# Patient Record
Sex: MALE | Race: WHITE | Employment: UNEMPLOYED | URBAN - METROPOLITAN AREA
[De-identification: names, ages, dates, MRNs, and addresses within clinical notes are randomized per-mention and may not be internally consistent; named-entity substitution may affect disease eponyms.]

---

## 2022-03-07 ENCOUNTER — HOSPITAL ENCOUNTER (INPATIENT)
Age: 67
LOS: 2 days | Discharge: LEFT BEFORE DISCHARGE | DRG: 872 | End: 2022-03-10
Attending: EMERGENCY MEDICINE | Admitting: STUDENT IN AN ORGANIZED HEALTH CARE EDUCATION/TRAINING PROGRAM
Payer: MEDICARE

## 2022-03-07 ENCOUNTER — APPOINTMENT (OUTPATIENT)
Dept: GENERAL RADIOLOGY | Age: 67
DRG: 872 | End: 2022-03-07
Payer: MEDICARE

## 2022-03-07 DIAGNOSIS — Z51.81 THERAPEUTIC DRUG MONITORING: ICD-10-CM

## 2022-03-07 DIAGNOSIS — L03.114 CELLULITIS OF LEFT UPPER EXTREMITY: Primary | ICD-10-CM

## 2022-03-07 LAB
ALBUMIN SERPL-MCNC: 3.4 GM/DL (ref 3.4–5)
ALP BLD-CCNC: 114 IU/L (ref 40–129)
ALT SERPL-CCNC: 8 U/L (ref 10–40)
ANION GAP SERPL CALCULATED.3IONS-SCNC: 12 MMOL/L (ref 4–16)
AST SERPL-CCNC: 17 IU/L (ref 15–37)
BASOPHILS ABSOLUTE: 0.1 K/CU MM
BASOPHILS RELATIVE PERCENT: 0.4 % (ref 0–1)
BILIRUB SERPL-MCNC: 0.6 MG/DL (ref 0–1)
BUN BLDV-MCNC: 6 MG/DL (ref 6–23)
CALCIUM SERPL-MCNC: 8.6 MG/DL (ref 8.3–10.6)
CHLORIDE BLD-SCNC: 93 MMOL/L (ref 99–110)
CO2: 24 MMOL/L (ref 21–32)
CREAT SERPL-MCNC: 0.9 MG/DL (ref 0.9–1.3)
DIFFERENTIAL TYPE: ABNORMAL
EOSINOPHILS ABSOLUTE: 0.1 K/CU MM
EOSINOPHILS RELATIVE PERCENT: 0.7 % (ref 0–3)
GFR AFRICAN AMERICAN: >60 ML/MIN/1.73M2
GFR NON-AFRICAN AMERICAN: >60 ML/MIN/1.73M2
GLUCOSE BLD-MCNC: 103 MG/DL (ref 70–99)
HCT VFR BLD CALC: 48.4 % (ref 42–52)
HEMOGLOBIN: 16.4 GM/DL (ref 13.5–18)
IMMATURE NEUTROPHIL %: 0.6 % (ref 0–0.43)
LACTATE: 1.6 MMOL/L (ref 0.4–2)
LYMPHOCYTES ABSOLUTE: 2.4 K/CU MM
LYMPHOCYTES RELATIVE PERCENT: 18.1 % (ref 24–44)
MCH RBC QN AUTO: 32.9 PG (ref 27–31)
MCHC RBC AUTO-ENTMCNC: 33.9 % (ref 32–36)
MCV RBC AUTO: 97 FL (ref 78–100)
MONOCYTES ABSOLUTE: 1.2 K/CU MM
MONOCYTES RELATIVE PERCENT: 8.8 % (ref 0–4)
NUCLEATED RBC %: 0 %
PDW BLD-RTO: 13.3 % (ref 11.7–14.9)
PLATELET # BLD: 213 K/CU MM (ref 140–440)
PMV BLD AUTO: 9 FL (ref 7.5–11.1)
POTASSIUM SERPL-SCNC: 3.4 MMOL/L (ref 3.5–5.1)
RBC # BLD: 4.99 M/CU MM (ref 4.6–6.2)
SEGMENTED NEUTROPHILS ABSOLUTE COUNT: 9.5 K/CU MM
SEGMENTED NEUTROPHILS RELATIVE PERCENT: 71.4 % (ref 36–66)
SODIUM BLD-SCNC: 129 MMOL/L (ref 135–145)
TOTAL IMMATURE NEUTOROPHIL: 0.08 K/CU MM
TOTAL NUCLEATED RBC: 0 K/CU MM
TOTAL PROTEIN: 6.8 GM/DL (ref 6.4–8.2)
WBC # BLD: 13.3 K/CU MM (ref 4–10.5)

## 2022-03-07 PROCEDURE — 85025 COMPLETE CBC W/AUTO DIFF WBC: CPT

## 2022-03-07 PROCEDURE — 73110 X-RAY EXAM OF WRIST: CPT

## 2022-03-07 PROCEDURE — 83605 ASSAY OF LACTIC ACID: CPT

## 2022-03-07 PROCEDURE — 87040 BLOOD CULTURE FOR BACTERIA: CPT

## 2022-03-07 PROCEDURE — 80053 COMPREHEN METABOLIC PANEL: CPT

## 2022-03-07 RX ORDER — CLINDAMYCIN PHOSPHATE 900 MG/50ML
900 INJECTION INTRAVENOUS ONCE
Status: COMPLETED | OUTPATIENT
Start: 2022-03-07 | End: 2022-03-08

## 2022-03-07 RX ORDER — 0.9 % SODIUM CHLORIDE 0.9 %
1000 INTRAVENOUS SOLUTION INTRAVENOUS ONCE
Status: COMPLETED | OUTPATIENT
Start: 2022-03-07 | End: 2022-03-08

## 2022-03-07 RX ORDER — KETOROLAC TROMETHAMINE 30 MG/ML
15 INJECTION, SOLUTION INTRAMUSCULAR; INTRAVENOUS ONCE
Status: COMPLETED | OUTPATIENT
Start: 2022-03-07 | End: 2022-03-08

## 2022-03-07 RX ORDER — SODIUM CHLORIDE 9 MG/ML
INJECTION, SOLUTION INTRAVENOUS CONTINUOUS
Status: DISCONTINUED | OUTPATIENT
Start: 2022-03-08 | End: 2022-03-08

## 2022-03-07 ASSESSMENT — PAIN DESCRIPTION - FREQUENCY: FREQUENCY: CONTINUOUS

## 2022-03-07 ASSESSMENT — PAIN DESCRIPTION - ORIENTATION: ORIENTATION: LEFT

## 2022-03-07 ASSESSMENT — PAIN DESCRIPTION - ONSET: ONSET: SUDDEN

## 2022-03-07 ASSESSMENT — PAIN DESCRIPTION - DESCRIPTORS: DESCRIPTORS: ACHING;BURNING

## 2022-03-07 ASSESSMENT — PAIN SCALES - GENERAL: PAINLEVEL_OUTOF10: 8

## 2022-03-07 ASSESSMENT — PAIN DESCRIPTION - PAIN TYPE: TYPE: ACUTE PAIN

## 2022-03-07 ASSESSMENT — PAIN DESCRIPTION - PROGRESSION: CLINICAL_PROGRESSION: GRADUALLY WORSENING

## 2022-03-07 ASSESSMENT — PAIN DESCRIPTION - LOCATION: LOCATION: ARM

## 2022-03-08 PROBLEM — A41.9 SEPSIS (HCC): Status: ACTIVE | Noted: 2022-03-08

## 2022-03-08 PROBLEM — I25.2 OLD INFEROLATERAL MYOCARDIAL INFARCTION: Status: ACTIVE | Noted: 2017-02-14

## 2022-03-08 PROBLEM — I25.5 ISCHEMIC CARDIOMYOPATHY: Status: ACTIVE | Noted: 2017-02-14

## 2022-03-08 PROBLEM — Z95.5 STENTED CORONARY ARTERY: Status: ACTIVE | Noted: 2017-02-14

## 2022-03-08 PROBLEM — I51.9 LEFT VENTRICULAR SYSTOLIC DYSFUNCTION: Status: ACTIVE | Noted: 2017-02-14

## 2022-03-08 PROBLEM — I25.10 ATHEROSCLEROSIS OF NATIVE CORONARY ARTERY OF NATIVE HEART WITHOUT ANGINA PECTORIS: Status: ACTIVE | Noted: 2017-02-14

## 2022-03-08 PROBLEM — R74.01 TRANSAMINITIS: Status: ACTIVE | Noted: 2017-01-24

## 2022-03-08 LAB
BACTERIA: ABNORMAL /HPF
BILIRUBIN URINE: NEGATIVE MG/DL
BLOOD, URINE: ABNORMAL
CLARITY: CLEAR
COLOR: YELLOW
CREATININE URINE: 83 MG/DL
GLUCOSE, URINE: NEGATIVE MG/DL
KETONES, URINE: NEGATIVE MG/DL
LEUKOCYTE ESTERASE, URINE: NEGATIVE
NITRITE URINE, QUANTITATIVE: NEGATIVE
PH, URINE: 6 (ref 5–8)
PROTEIN UA: NEGATIVE MG/DL
RBC URINE: <1 /HPF (ref 0–3)
SODIUM URINE: 70 MMOL/L (ref 35–167)
SPECIFIC GRAVITY UA: 1.01 (ref 1–1.03)
SQUAMOUS EPITHELIAL: <1 /HPF
UROBILINOGEN, URINE: 0.2 MG/DL (ref 0.2–1)
WBC UA: <1 /HPF (ref 0–2)

## 2022-03-08 PROCEDURE — 87075 CULTR BACTERIA EXCEPT BLOOD: CPT

## 2022-03-08 PROCEDURE — 81001 URINALYSIS AUTO W/SCOPE: CPT

## 2022-03-08 PROCEDURE — 87070 CULTURE OTHR SPECIMN AEROBIC: CPT

## 2022-03-08 PROCEDURE — 6360000002 HC RX W HCPCS: Performed by: PHYSICIAN ASSISTANT

## 2022-03-08 PROCEDURE — 96365 THER/PROPH/DIAG IV INF INIT: CPT

## 2022-03-08 PROCEDURE — 2500000003 HC RX 250 WO HCPCS: Performed by: STUDENT IN AN ORGANIZED HEALTH CARE EDUCATION/TRAINING PROGRAM

## 2022-03-08 PROCEDURE — 84300 ASSAY OF URINE SODIUM: CPT

## 2022-03-08 PROCEDURE — 6370000000 HC RX 637 (ALT 250 FOR IP): Performed by: STUDENT IN AN ORGANIZED HEALTH CARE EDUCATION/TRAINING PROGRAM

## 2022-03-08 PROCEDURE — 96375 TX/PRO/DX INJ NEW DRUG ADDON: CPT

## 2022-03-08 PROCEDURE — 2500000003 HC RX 250 WO HCPCS: Performed by: PHYSICIAN ASSISTANT

## 2022-03-08 PROCEDURE — 2580000003 HC RX 258: Performed by: PHYSICIAN ASSISTANT

## 2022-03-08 PROCEDURE — 87086 URINE CULTURE/COLONY COUNT: CPT

## 2022-03-08 PROCEDURE — 2580000003 HC RX 258: Performed by: STUDENT IN AN ORGANIZED HEALTH CARE EDUCATION/TRAINING PROGRAM

## 2022-03-08 PROCEDURE — 6360000002 HC RX W HCPCS: Performed by: STUDENT IN AN ORGANIZED HEALTH CARE EDUCATION/TRAINING PROGRAM

## 2022-03-08 PROCEDURE — 82570 ASSAY OF URINE CREATININE: CPT

## 2022-03-08 PROCEDURE — 80061 LIPID PANEL: CPT

## 2022-03-08 PROCEDURE — 87081 CULTURE SCREEN ONLY: CPT

## 2022-03-08 PROCEDURE — 87040 BLOOD CULTURE FOR BACTERIA: CPT

## 2022-03-08 PROCEDURE — 99285 EMERGENCY DEPT VISIT HI MDM: CPT

## 2022-03-08 PROCEDURE — 87077 CULTURE AEROBIC IDENTIFY: CPT

## 2022-03-08 PROCEDURE — 80048 BASIC METABOLIC PNL TOTAL CA: CPT

## 2022-03-08 PROCEDURE — 6370000000 HC RX 637 (ALT 250 FOR IP): Performed by: INTERNAL MEDICINE

## 2022-03-08 PROCEDURE — 1200000000 HC SEMI PRIVATE

## 2022-03-08 RX ORDER — NICOTINE 21 MG/24HR
1 PATCH, TRANSDERMAL 24 HOURS TRANSDERMAL DAILY
Status: DISCONTINUED | OUTPATIENT
Start: 2022-03-08 | End: 2022-03-10 | Stop reason: HOSPADM

## 2022-03-08 RX ORDER — 0.9 % SODIUM CHLORIDE 0.9 %
1000 INTRAVENOUS SOLUTION INTRAVENOUS ONCE
Status: COMPLETED | OUTPATIENT
Start: 2022-03-08 | End: 2022-03-08

## 2022-03-08 RX ORDER — ONDANSETRON 4 MG/1
4 TABLET, ORALLY DISINTEGRATING ORAL EVERY 8 HOURS PRN
Status: DISCONTINUED | OUTPATIENT
Start: 2022-03-08 | End: 2022-03-10 | Stop reason: HOSPADM

## 2022-03-08 RX ORDER — HYDROXYZINE PAMOATE 25 MG/1
25 CAPSULE ORAL 3 TIMES DAILY PRN
COMMUNITY
Start: 2021-09-06

## 2022-03-08 RX ORDER — POTASSIUM CHLORIDE 20 MEQ/1
10 TABLET, EXTENDED RELEASE ORAL ONCE
Status: COMPLETED | OUTPATIENT
Start: 2022-03-08 | End: 2022-03-08

## 2022-03-08 RX ORDER — CARVEDILOL 6.25 MG/1
6.25 TABLET ORAL 2 TIMES DAILY WITH MEALS
COMMUNITY

## 2022-03-08 RX ORDER — POLYETHYLENE GLYCOL 3350 17 G/17G
17 POWDER, FOR SOLUTION ORAL DAILY PRN
Status: DISCONTINUED | OUTPATIENT
Start: 2022-03-08 | End: 2022-03-10 | Stop reason: HOSPADM

## 2022-03-08 RX ORDER — CLINDAMYCIN PHOSPHATE 600 MG/50ML
600 INJECTION INTRAVENOUS EVERY 8 HOURS
Status: DISCONTINUED | OUTPATIENT
Start: 2022-03-08 | End: 2022-03-09

## 2022-03-08 RX ORDER — SODIUM CHLORIDE 9 MG/ML
25 INJECTION, SOLUTION INTRAVENOUS PRN
Status: DISCONTINUED | OUTPATIENT
Start: 2022-03-08 | End: 2022-03-10 | Stop reason: HOSPADM

## 2022-03-08 RX ORDER — SODIUM CHLORIDE 0.9 % (FLUSH) 0.9 %
5-40 SYRINGE (ML) INJECTION PRN
Status: DISCONTINUED | OUTPATIENT
Start: 2022-03-08 | End: 2022-03-10 | Stop reason: HOSPADM

## 2022-03-08 RX ORDER — KETOROLAC TROMETHAMINE 30 MG/ML
15 INJECTION, SOLUTION INTRAMUSCULAR; INTRAVENOUS EVERY 6 HOURS PRN
Status: DISCONTINUED | OUTPATIENT
Start: 2022-03-08 | End: 2022-03-10 | Stop reason: HOSPADM

## 2022-03-08 RX ORDER — ONDANSETRON 2 MG/ML
4 INJECTION INTRAMUSCULAR; INTRAVENOUS EVERY 6 HOURS PRN
Status: DISCONTINUED | OUTPATIENT
Start: 2022-03-08 | End: 2022-03-10 | Stop reason: HOSPADM

## 2022-03-08 RX ORDER — ACETAMINOPHEN 650 MG/1
650 SUPPOSITORY RECTAL EVERY 6 HOURS PRN
Status: DISCONTINUED | OUTPATIENT
Start: 2022-03-08 | End: 2022-03-10 | Stop reason: HOSPADM

## 2022-03-08 RX ORDER — SODIUM CHLORIDE 0.9 % (FLUSH) 0.9 %
5-40 SYRINGE (ML) INJECTION EVERY 12 HOURS SCHEDULED
Status: DISCONTINUED | OUTPATIENT
Start: 2022-03-08 | End: 2022-03-10 | Stop reason: HOSPADM

## 2022-03-08 RX ORDER — ACETAMINOPHEN 325 MG/1
650 TABLET ORAL EVERY 6 HOURS PRN
Status: DISCONTINUED | OUTPATIENT
Start: 2022-03-08 | End: 2022-03-10 | Stop reason: HOSPADM

## 2022-03-08 RX ADMIN — SODIUM CHLORIDE, PRESERVATIVE FREE 10 ML: 5 INJECTION INTRAVENOUS at 20:14

## 2022-03-08 RX ADMIN — CLINDAMYCIN PHOSPHATE 900 MG: 900 INJECTION, SOLUTION INTRAVENOUS at 00:05

## 2022-03-08 RX ADMIN — SODIUM CHLORIDE 1000 ML: 9 INJECTION, SOLUTION INTRAVENOUS at 02:56

## 2022-03-08 RX ADMIN — SODIUM CHLORIDE, PRESERVATIVE FREE 10 ML: 5 INJECTION INTRAVENOUS at 08:57

## 2022-03-08 RX ADMIN — SODIUM CHLORIDE 1000 ML: 9 INJECTION, SOLUTION INTRAVENOUS at 00:04

## 2022-03-08 RX ADMIN — CLINDAMYCIN PHOSPHATE 600 MG: 600 INJECTION, SOLUTION INTRAVENOUS at 17:10

## 2022-03-08 RX ADMIN — KETOROLAC TROMETHAMINE 15 MG: 30 INJECTION, SOLUTION INTRAMUSCULAR; INTRAVENOUS at 00:02

## 2022-03-08 RX ADMIN — ENOXAPARIN SODIUM 40 MG: 100 INJECTION SUBCUTANEOUS at 08:57

## 2022-03-08 RX ADMIN — CLINDAMYCIN PHOSPHATE 600 MG: 600 INJECTION, SOLUTION INTRAVENOUS at 09:00

## 2022-03-08 RX ADMIN — POTASSIUM CHLORIDE 10 MEQ: 1500 TABLET, EXTENDED RELEASE ORAL at 02:55

## 2022-03-08 ASSESSMENT — ENCOUNTER SYMPTOMS
SHORTNESS OF BREATH: 0
ABDOMINAL PAIN: 0
COLOR CHANGE: 0
SORE THROAT: 0
WHEEZING: 0
VOMITING: 0
CHEST TIGHTNESS: 0
ANAL BLEEDING: 0
NAUSEA: 0
COUGH: 0
DIARRHEA: 0
BLOOD IN STOOL: 0
RHINORRHEA: 0

## 2022-03-08 ASSESSMENT — PAIN SCALES - GENERAL: PAINLEVEL_OUTOF10: 8

## 2022-03-08 NOTE — ED PROVIDER NOTES
EMERGENCY DEPARTMENT ENCOUNTER      PCP: No primary care provider on file. CHIEF COMPLAINT    Chief Complaint   Patient presents with   [address]     left wrist, radiating up arm rash, pain     This patient was not evaluated by the attending physician. I have independently evaluated this patient . HPI    Nydia Prather is a 61 y.o. male who presents to the emergency department today with left wrist pain swelling erythema. Patient states that he may have been bit by an insect on the left wrist and now developed into this redness, pain. Denies history of cellulitis, is not immunocompromise. Denies history of IV drug abuse. Denies any history of significant medical issues. REVIEW OF SYSTEMS    Constitutional:  No fever, chills  HENT:  Denies upper respiratory symptoms  Respiratory:  No cough or shortness of breath   Cardiovascular:  No chest pain  GI:  Denies abdominal pain, nausea, vomiting, or diarrhea  :  Denies any urinary symptoms . Musculoskeletal:  See HPI. Denies backpain. Skin:  See HPI. Skin intact. Lymphatic:  Denies swollen glands or streaks. Endocrine:  Denies polyuria or polydypsia   Neurologic:  Denies headache, focal weakness or sensory changes    All other review of systems are negative  See HPI and nursing notes for additional information       PAST MEDICAL HISTORY/SURGICAL HISTORY     No past medical history on file. No past surgical history on file.     CURRENT MEDICATIONS    Current Outpatient Rx   Medication Sig Dispense Refill    carvedilol (COREG) 6.25 MG tablet Take 6.25 mg by mouth 2 times daily (with meals)      hydrOXYzine (VISTARIL) 25 MG capsule Take 25 mg by mouth 3 times daily as needed         ALLERGIES    Not on File    FAMILY HISTORY/SOCIAL HISTORY    Family History   Problem Relation Age of Onset    Dementia Mother     Coronary Art Dis Father      Social History     Socioeconomic History    Marital status:      Spouse name: None    Number of children: None    Years of education: None    Highest education level: None   Occupational History    None   Tobacco Use    Smoking status: Current Every Day Smoker     Packs/day: 0.50     Types: Cigarettes    Smokeless tobacco: Never Used   Substance and Sexual Activity    Alcohol use: Yes     Alcohol/week: 14.0 standard drinks     Types: 14 Cans of beer per week    Drug use: Not Currently    Sexual activity: None   Other Topics Concern    None   Social History Narrative    None     Social Determinants of Health     Financial Resource Strain:     Difficulty of Paying Living Expenses: Not on file   Food Insecurity:     Worried About Running Out of Food in the Last Year: Not on file    Jayesh of Food in the Last Year: Not on file   Transportation Needs:     Lack of Transportation (Medical): Not on file    Lack of Transportation (Non-Medical):  Not on file   Physical Activity:     Days of Exercise per Week: Not on file    Minutes of Exercise per Session: Not on file   Stress:     Feeling of Stress : Not on file   Social Connections:     Frequency of Communication with Friends and Family: Not on file    Frequency of Social Gatherings with Friends and Family: Not on file    Attends [employer] Services: Not on file    Active Member of [address] [place] [address] or Organizations: Not on file    Attends Club or Organization Meetings: Not on file    Marital Status: Not on file   Intimate Partner Violence:     Fear of Current or Ex-Partner: Not on file    Emotionally Abused: Not on file    Physically Abused: Not on file    Sexually Abused: Not on file   Housing Stability:     Unable to Pay for Housing in the Last Year: Not on file    Number of [place] in the Last Year: Not on file    Unstable Housing in the Last Year: Not on file       PHYSICAL EXAM    VITAL SIGNS: /78   Pulse 111   Temp 98.4 °F (36.9 °C) (Oral)   Resp 18   Ht 5' 10\" (1.778 m)   Wt 145 lb (65.8 kg)   SpO2 95%   BMI 20.81 kg/m²    Constitutional:  Well developed, Well nourished  HENT:  Atraumatic, Normocephalic, PERRL, no eye drainage noted, bilateral external ears normal, no sinus tenderness, nose normal, oropharynx moist, no pharyngeal exudates. Neck- supple. No adenopathy. Respiratory:  No retractions, lungs are clear   Cardiovascular:  Heart rate tachycardic, no JVD  GI:  Soft, No tenderness   Musculoskeletal:  No acute bony deformity, no obvious joint effusion   Integument: Patient does demonstrate indurated macular erythema with tenderness to go orders to the dorsal aspect of the wrist is, has nearly circumferential into the forearm and there is lymphangitis swelling. Vascular: Dorsalis pedis pulses 2+ equal bilaterally  Neurologic:  Alert & oriented, no slurred speech.        LABS:  Results for orders placed or performed during the hospital encounter of 03/07/22   CBC with Auto Differential   Result Value Ref Range    WBC 13.3 (H) 4.0 - 10.5 K/CU MM    RBC 4.99 4.6 - 6.2 M/CU MM    Hemoglobin 16.4 13.5 - 18.0 GM/DL    Hematocrit 48.4 42 - 52 %    MCV 97.0 78 - 100 FL    MCH 32.9 (H) 27 - 31 PG    MCHC 33.9 32.0 - 36.0 %    RDW 13.3 11.7 - 14.9 %    Platelets 770 383 - 288 K/CU MM    MPV 9.0 7.5 - 11.1 FL    Differential Type AUTOMATED DIFFERENTIAL     Segs Relative 71.4 (H) 36 - 66 %    Lymphocytes % 18.1 (L) 24 - 44 %    Monocytes % 8.8 (H) 0 - 4 %    Eosinophils % 0.7 0 - 3 %    Basophils % 0.4 0 - 1 %    Segs Absolute 9.5 K/CU MM    Lymphocytes Absolute 2.4 K/CU MM    Monocytes Absolute 1.2 K/CU MM    Eosinophils Absolute 0.1 K/CU MM    Basophils Absolute 0.1 K/CU MM    Nucleated RBC % 0.0 %    Total Nucleated RBC 0.0 K/CU MM    Total Immature Neutrophil 0.08 K/CU MM    Immature Neutrophil % 0.6 (H) 0 - 0.43 %   Comprehensive Metabolic Panel   Result Value Ref Range    Sodium 129 (L) 135 - 145 MMOL/L    Potassium 3.4 (L) 3.5 - 5.1 MMOL/L    Chloride 93 (L) 99 - 110 mMol/L    CO2 24 21 - 32 MMOL/L    BUN 6 6 - 23 MG/DL CREATININE 0.9 0.9 - 1.3 MG/DL    Glucose 103 (H) 70 - 99 MG/DL    Calcium 8.6 8.3 - 10.6 MG/DL    Albumin 3.4 3.4 - 5.0 GM/DL    Total Protein 6.8 6.4 - 8.2 GM/DL    Total Bilirubin 0.6 0.0 - 1.0 MG/DL    ALT 8 (L) 10 - 40 U/L    AST 17 15 - 37 IU/L    Alkaline Phosphatase 114 40 - 129 IU/L    GFR Non-African American >60 >60 mL/min/1.73m2    GFR African American >60 >60 mL/min/1.73m2    Anion Gap 12 4 - 16   Lactic Acid   Result Value Ref Range    Lactate 1.6 0.4 - 2.0 mMOL/L     IMAGING:  XR WRIST LEFT (MIN 3 VIEWS)    Result Date: 3/7/2022  EXAMINATION: XRAY VIEWS OF THE LEFT WRIST 3/7/2022 11:18 pm COMPARISON: None. HISTORY: ORDERING SYSTEM PROVIDED HISTORY: insect bite and cellulitis TECHNOLOGIST PROVIDED HISTORY: Reason for exam:->insect bite and cellulitis Reason for Exam: insect bite Additional signs and symptoms: left wrist pain, swelling, redness, open wound FINDINGS: No fracture or osseous erosion. Normal alignment. Dorsal soft tissue swelling. No soft tissue gas or radiopaque foreign body. Dorsal soft tissue swelling. No underlying osseous abnormality or soft tissue gas. ED COURSE & MEDICAL DECISION MAKING    Patient presents as above. Patient seen and examined. Emergent etiologies considered. Patient does have pretty significant cellulitis and insect bite with pain into the left upper extremity. It is nearly circumferential, there is evidence of lymphangitis. Appears superficial, not deep into the joint, has good range of motion. Secondary to the nature did not start empiric antibiotics. Patient was tachycardic to have elevated white blood cell count, will look to admit for observation for the nature of the cellulitis and patient's poor follow-up. Patient agrees to return emergency department if symptoms worsen or any new symptoms develop. All pertinent Lab data and radiographic results reviewed with patient at bedside.         The patient and/or the family were informed of the results of any tests/labs/imaging, the treatment plan, and time was allotted to answer questions. Clinical  IMPRESSION    1. Cellulitis of left upper extremity      Comment: Please note this report has been produced using speech recognition software and may contain errors related to that system including errors in grammar, punctuation, and spelling, as well as words and phrases that may be inappropriate. If there are any questions or concerns please feel free to contact the dictating provider for clarification.       Marino Lomeli, PA  03/08/22 0959

## 2022-03-08 NOTE — H&P
History and Physical      Name:  Nu Severino /Age/Sex: 1955  (61 y.o. male)   MRN & CSN:  [id number] & [id number] Encounter Date/Time: 3/8/2022 2:59 AM EST   Location:  [place] [address] PCP: No primary care provider on file. Hospital Day: 2    Assessment and Plan:   Nu Severino is a 61 y.o. male with a pmh of tobacco abuse, TBI, CAD s/p STEMI s/p PCI with LIANET to OM on 2017, and ischemic cardiomyopathy with combined systolic and diastolic heart failure, who presents with Sepsis [facility]    Hospital Problems           Last Modified POA    * (Principal) Sepsis ([address] 3/8/2022 Yes        1. Cellulitis of left upper limb  2. Sepsis without septic shock, 2/2 above  3. Leukocytosis, 2/2 above  1. Admit to inpatient services  Sepsis criteria: Heart rate >90, WBC >12 or <4 or >10% bands and Source of infection: Cellulitis  Septic shock: Not present  MODS: Not present  QSOFA score: 0  2. Patient was started on clindamycin in ED and we will continue this. We will obtain a MRSA nares swab  3. Patient was given 1 L NS bolus in ED and we will round out sepsis fluid bundle with 1 additional liter NS fluid bolus given persistent tachycardia. 4. We will hold off on further IVF overnight given history of CHF  5. Given sepsis criteria we will go ahead and obtain UA with microscopy, urine culture, and blood cultures x2  6. If worsening despite IV antibiotics would consider orthopedic or general surgery consult    4. Hyponatremia  5. Hypokalemia  1. Sodium 129 and potassium 3.4  2. IVF as above  3. 10 mEq oral potassium  4. UA with microscopy, urine sodium, and urine creatinine  5. Can consider nephrology consult if not improved with IVF and supplemental potassium  6. Recheck electrolytes in a.m. 6. Noncompliant  7. Tobacco abuse  1. Despite extensive cardiovascular history patient states he is not on any medication  2. We will go ahead and check lipid panel and A1c  3.  We will also monitor blood pressure overnight  4. Patient will likely need to be placed on preventative medications for his cardiovascular risk factors at discharge  5. Recommend immediate cessation from tobacco use    Other chronic medical conditions:   TBI  CAD s/p STEMI s/p PCI with LIANET to OM on 1/20/2017  Ischemic cardiomyopathy with chronic combined systolic and diastolic heart failure    Diet ADULT DIET; Regular; Low Fat/Low Chol/High Fiber/RASHID; No Added Salt (3-4 gm)   DVT Prophylaxis [] Lovenox, []  Heparin, [] SCDs, [] Ambulation,  [] Eliquis, [] Xarelto   Code Status Full Code     History from:     patient    History of Present Illness:     Chief Complaint: Sepsis ([facility] [address]  Edna Townsend is a 61 y.o. male with pmh of tobacco abuse, TBI, CAD s/p STEMI s/p PCI with LIANET to OM on 1/20/2017, and ischemic cardiomyopathy with combined systolic and diastolic heart failure, who presents with complaints of erythema and tenderness to distal left forearm. Patient states onset was 3/5/2022 and states \"I think is from a spider bite. \"  Patient states symptoms are worsening and now includes lymphangitic streak FPC up left forearm. Patient denies any outpatient treatment. Patient states \"I tried a Band-Aid on it but that did not help. \"  Patient denies any loss of functional status of left wrist or hand. Does endorse mild tenderness with abduction and abduction of left wrist.  Denies fevers, chills, nausea, or emesis. Patient does have additional complaint of chronic cough that he attributes to his smoking. Otherwise patient denies headache, chest pain, shortness of breath, abdominal pain, diarrhea, melena, hematochezia, dysuria, frequency, or urgency. Discussed case with ED provider. Review of Systems:   Review of Systems   Constitutional: Negative for appetite change, chills, diaphoresis, fatigue and fever. HENT: Negative for congestion, rhinorrhea and sore throat. Eyes: Negative for visual disturbance.    Respiratory: Negative for cough, chest tightness, shortness of breath and wheezing. Cardiovascular: Negative for chest pain and palpitations. Gastrointestinal: Negative for abdominal pain, anal bleeding, blood in stool, diarrhea, nausea and vomiting. Genitourinary: Negative for dysuria, frequency, hematuria and urgency. Musculoskeletal: Negative for arthralgias. Skin: Positive for wound (Distal left forearm). Negative for color change and rash. Neurological: Negative for dizziness, seizures, weakness, numbness and headaches. Psychiatric/Behavioral: Negative for confusion. Objective: Intake/Output Summary (Last 24 hours) at 3/8/2022 0259  Last data filed at 3/8/2022 0130  Gross per 24 hour   Intake 1050 ml   Output --   Net 1050 ml      Vitals:   Vitals:    03/07/22 2243   BP: 122/78   Pulse: 111   Resp: 18   Temp: 98.4 °F (36.9 °C)   TempSrc: Oral   SpO2: 95%   Weight: 145 lb (65.8 kg)   Height: 5' 10\" (1.778 m)       Medications Prior to Admission     Prior to Admission medications    Medication Sig Start Date End Date Taking? Authorizing Provider   carvedilol (COREG) 6.25 MG tablet Take 6.25 mg by mouth 2 times daily (with meals)    Historical Provider, MD   hydrOXYzine (VISTARIL) 25 MG capsule Take 25 mg by mouth 3 times daily as needed 9/6/21   Historical Provider, MD       Physical Exam:    Physical Exam  Vitals and nursing note reviewed. Constitutional:       General: He is awake. He is not in acute distress. Appearance: Normal appearance. He is normal weight. He is not ill-appearing, toxic-appearing or diaphoretic. Interventions: He is not intubated. HENT:      Head: Atraumatic. Right Ear: External ear normal.      Left Ear: External ear normal.      Nose: Nose normal. No rhinorrhea. Mouth/Throat:      Mouth: Mucous membranes are moist.   Cardiovascular:      Rate and Rhythm: Regular rhythm. Tachycardia present. Pulses: Normal pulses.    Pulmonary:      Effort: Pulmonary effort is normal. No tachypnea or respiratory distress. He is not intubated. Breath sounds: Normal breath sounds. No wheezing, rhonchi or rales. Abdominal:      General: Abdomen is flat. Bowel sounds are normal. There is no distension. Palpations: Abdomen is soft. Tenderness: There is no abdominal tenderness. There is no guarding or rebound. Musculoskeletal:         General: Normal range of motion. Cervical back: Neck supple. Right lower leg: No edema. Left lower leg: No edema. Skin:     General: Skin is warm and dry. Capillary Refill: Capillary refill takes less than 2 seconds. Findings: Erythema, lesion and wound present. Neurological:      Mental Status: He is alert and oriented to person, place, and time. Mental status is at baseline. Cranial Nerves: No dysarthria or facial asymmetry. Motor: No tremor or seizure activity. Psychiatric:         Mood and Affect: Mood is not anxious. Speech: He is communicative. Speech is not slurred. Past Medical History:   PMHx No past medical history on file. PSHX:  has no past surgical history on file. Allergies: Not on File  Fam HX: Reviewed family history includes Coronary Art Dis in his father; Dementia in his mother. Soc HX:   Social History     Socioeconomic History    Marital status:      Spouse name: None    Number of children: None    Years of education: None    Highest education level: None   Occupational History    None   Tobacco Use    Smoking status: Current Every Day Smoker     Packs/day: 0.50     Types: Cigarettes    Smokeless tobacco: Never Used   Substance and Sexual Activity    Alcohol use:  Yes     Alcohol/week: 14.0 standard drinks     Types: 14 Cans of beer per week    Drug use: Not Currently    Sexual activity: None   Other Topics Concern    None   Social History Narrative    None     Social Determinants of Health     Financial Resource Strain:     Difficulty of Paying Living Expenses: Not on file   Food Insecurity:     Worried About Running Out of Food in the Last Year: Not on file    Ran Out of Food in the Last Year: Not on file   Transportation Needs:     Lack of Transportation (Medical): Not on file    Lack of Transportation (Non-Medical):  Not on file   Physical Activity:     Days of Exercise per Week: Not on file    Minutes of Exercise per Session: Not on file   Stress:     Feeling of Stress : Not on file   Social Connections:     Frequency of Communication with Friends and Family: Not on file    Frequency of Social Gatherings with Friends and Family: Not on file    Attends Sabianist Services: Not on file    Active Member of Clubs or Organizations: Not on file    Attends Club or Organization Meetings: Not on file    Marital Status: Not on file   Intimate Partner Violence:     Fear of Current or Ex-Partner: Not on file    Emotionally Abused: Not on file    Physically Abused: Not on file    Sexually Abused: Not on file   Housing Stability:     Unable to Pay for Housing in the Last Year: Not on file    Number of Places Lived in the Last Year: Not on file    Unstable Housing in the Last Year: Not on file       Medications:   Medications:    sodium chloride  1,000 mL IntraVENous Once    clindamycin (CLEOCIN) IV  600 mg IntraVENous Q8H    sodium chloride flush  5-40 mL IntraVENous 2 times per day    enoxaparin  40 mg SubCUTAneous Daily      Infusions:    sodium chloride       PRN Meds: sodium chloride flush, 5-40 mL, PRN  sodium chloride, 25 mL, PRN  ondansetron, 4 mg, Q8H PRN   Or  ondansetron, 4 mg, Q6H PRN  polyethylene glycol, 17 g, Daily PRN  acetaminophen, 650 mg, Q6H PRN   Or  acetaminophen, 650 mg, Q6H PRN  ketorolac, 15 mg, Q6H PRN        Labs      CBC:   Recent Labs     03/07/22  2310   WBC 13.3*   HGB 16.4        BMP:    Recent Labs     03/07/22  2310   *   K 3.4*   CL 93*   CO2 24   BUN 6   CREATININE 0.9   GLUCOSE 103* Hepatic:   Recent Labs     03/07/22  2310   AST 17   ALT 8*   BILITOT 0.6   ALKPHOS 114     Lipids: No results found for: CHOL, HDL, TRIG  Hemoglobin A1C: No results found for: LABA1C  TSH: No results found for: TSH  Troponin: No results found for: TROPONINT  Lactic Acid: No results for input(s): LACTA in the last 72 hours. BNP: No results for input(s): PROBNP in the last 72 hours. UA:No results found for: Linda Boards, PHUR, LABCAST, WBCUA, RBCUA, MUCUS, TRICHOMONAS, YEAST, BACTERIA, CLARITYU, SPECGRAV, LEUKOCYTESUR, UROBILINOGEN, BILIRUBINUR, BLOODU, GLUCOSEU, KETUA, AMORPHOUS  Urine Cultures: No results found for: LABURIN  Blood Cultures: No results found for: BC  No results found for: BLOODCULT2  Organism: No results found for: ORG    Imaging/Diagnostics Last 24 Hours   XR WRIST LEFT (MIN 3 VIEWS)    Result Date: 3/7/2022  Dorsal soft tissue swelling. No underlying osseous abnormality or soft tissue gas. Personally reviewed lab work and imaging. This dictation was created with voice recognition software. While attempts have been made to review the dictation as it is transcribed, on occasion the spoken word can be misinterpreted by the technology leading to omissions or inappropriate words, phrases or sentences.      Electronically signed by Tim Boateng DO on 3/8/2022 at 2:59 AM

## 2022-03-08 NOTE — ED NOTES
Pt arrived in ED with an insect bite on left wrist. Rash and pain radiating toward left elbow.       Neal Corbin  03/07/22 6043

## 2022-03-08 NOTE — PROGRESS NOTES
Pt has a spider bite to left wrist that is the size of a dime that is red and oozing red/clear liquid. Skin drawing around spider bite. Pt has red streaking up to left elbow. No other skin issues noted per pt.

## 2022-03-08 NOTE — ED NOTES
Patient much more polite, but refuses to be placed on the monitor. Patient is visible from nurses station.        [facility]  03/08/22 9482

## 2022-03-08 NOTE — PROGRESS NOTES
Hospitalist Progress Note      Name:  Early Severin /Age/Sex: 1955  (61 y.o. male)   MRN & CSN:  [id number] & [id number] Admission Date/Time: 3/7/2022 10:42 PM   Location:  [address] [place] [address] PCP: No primary care provider on file. Hospital Day: 2    Assessment and Plan:   Early Severin is a 61 y.o.  male  who presents with Sepsis ([facility] [address]      1. Cellulitis of left upper limb  2. Sepsis without septic shock, 2/2 above  3. Leukocytosis, 2/2 above   - Monitor CBC     4. Hyponatremia  5. Hypokalemia  1. Recheck electrolytes serially     6. Noncompliant  7. Tobacco abuse  1. Despite extensive cardiovascular history patient states he is not on any medication  2. Recommend immediate cessation from tobacco use    Diet ADULT DIET; Regular; Low Fat/Low Chol/High Fiber/RASHID; No Added Salt (3-4 gm)   DVT Prophylaxis [] Lovenox, []  Heparin, [] SCDs, [] Ambulation   GI Prophylaxis [] PPI,  [] H2 Blocker,  [] Carafate,  [] Diet/Tube Feeds   Code Status Full Code   Disposition    MDM [] Low, [] Moderate,[]  High       History of Present Illness:     Chief Complaint: Sepsis ([facility] [address]  Early Severin is a 61 y.o.  male  who presents with the above    Subjective : No new event overnight. Patient seen and evaluated in the room. He is observed laying down in bed. He was complaining of arm pain, he thinks he was bitten by spider. Objective: Intake/Output Summary (Last 24 hours) at 3/8/2022 1656  Last data filed at 3/8/2022 0130  Gross per 24 hour   Intake 1050 ml   Output --   Net 1050 ml      Vitals:   Vitals:    22 1354   BP: 112/70   Pulse: 83   Resp: 16   Temp: 96.6 °F (35.9 °C)   SpO2: 97%     Physical Exam:   GEN Awake male, sitting upright in bed in no apparent distress. Appears given age. EYES Pupils are equally round. No scleral erythema, discharge, or conjunctivitis. HENT Mucous membranes are moist. Oral pharynx without exudates, no evidence of thrush.   NECK Supple, no apparent thyromegaly or masses. RESP Clear to auscultation, no wheezes, rales or rhonchi. Symmetric chest movement while on room air. CARDIO/VASC S1/S2 auscultated. Regular rate without appreciable murmurs, rubs, or gallops. No JVD or carotid bruits. Peripheral pulses equal bilaterally and palpable. No peripheral edema. GI Abdomen is soft without significant tenderness, masses, or guarding. Bowel sounds are normoactive. Rectal exam deferred.  No costovertebral angle tenderness. Normal appearing external genitalia. Virk catheter is not present. HEME/LYMPH No palpable cervical lymphadenopathy and no hepatosplenomegaly. No petechiae or ecchymoses. MSK No gross joint deformities. SKIN Normal coloration, warm, dry. NEURO Cranial nerves appear grossly intact, normal speech, no lateralizing weakness. PSYCH Awake, alert, oriented x 4. Affect appropriate. Medications:   Medications:    clindamycin (CLEOCIN) IV  600 mg IntraVENous Q8H    sodium chloride flush  5-40 mL IntraVENous 2 times per day    enoxaparin  40 mg SubCUTAneous Daily      Infusions:    sodium chloride       PRN Meds: sodium chloride flush, 5-40 mL, PRN  sodium chloride, 25 mL, PRN  ondansetron, 4 mg, Q8H PRN   Or  ondansetron, 4 mg, Q6H PRN  polyethylene glycol, 17 g, Daily PRN  acetaminophen, 650 mg, Q6H PRN   Or  acetaminophen, 650 mg, Q6H PRN  ketorolac, 15 mg, Q6H PRN           The anticipated discharge is in greater than 24 hours.      Electronically signed by Cyrus Harrison DO on 3/8/2022 at 4:56 PM

## 2022-03-08 NOTE — PLAN OF CARE
Problem: Falls - Risk of:  Goal: Will remain free from falls  Description: Will remain free from falls  3/8/2022 0913 by Samantha Bey RN  Outcome: Ongoing  3/8/2022 0913 by Samantha Bey RN  Outcome: Ongoing  Goal: Absence of physical injury  Description: Absence of physical injury  3/8/2022 0913 by Samantha Bey RN  Outcome: Ongoing  3/8/2022 0913 by Samantha Bey RN  Outcome: Ongoing     Problem: Pain:  Description: Pain management should include both nonpharmacologic and pharmacologic interventions.   Goal: Pain level will decrease  Description: Pain level will decrease  3/8/2022 0913 by Samantha Bey RN  Outcome: Ongoing  3/8/2022 0913 by Samantha Bey RN  Outcome: Ongoing  Goal: Control of acute pain  Description: Control of acute pain  3/8/2022 0913 by Samantha Bey RN  Outcome: Ongoing  3/8/2022 0913 by Samantha Bey RN  Outcome: Ongoing  Goal: Control of chronic pain  Description: Control of chronic pain  3/8/2022 0913 by Samantha Bey RN  Outcome: Ongoing  3/8/2022 0913 by Samantha Bey RN  Outcome: Ongoing

## 2022-03-08 NOTE — ED NOTES
Patient yelling and cussing at this nurse because he is tired of hearing the constant beeping. Refuses the monitor being placed on him at this time. This nurse asked patient why he was acting so upset. Asked patient to please not be rude.   Patient states he was sorry for disrespect and would not be rude anymore     Jared Basurto RN  03/08/22 7720

## 2022-03-08 NOTE — CARE COORDINATION
Reviewed chart and spoke with pt about discharge needs/plans. Pt lives with his daughter. PTA he was independent with ADL's but has no transportation. He has insurance but no PCP. Place transportation and PCP resources in [place].    When discussed in IDR plan for ATB not cleare. If atb needs to be iv will need a picc line and HC/infusion set up , also would need a MD to follow. CM will continue to follow.

## 2022-03-09 LAB
CULTURE: ABNORMAL
Lab: ABNORMAL
SPECIMEN: ABNORMAL

## 2022-03-09 PROCEDURE — 6360000002 HC RX W HCPCS: Performed by: INTERNAL MEDICINE

## 2022-03-09 PROCEDURE — 94761 N-INVAS EAR/PLS OXIMETRY MLT: CPT

## 2022-03-09 PROCEDURE — 2580000003 HC RX 258: Performed by: INTERNAL MEDICINE

## 2022-03-09 PROCEDURE — 6370000000 HC RX 637 (ALT 250 FOR IP): Performed by: INTERNAL MEDICINE

## 2022-03-09 PROCEDURE — 1200000000 HC SEMI PRIVATE

## 2022-03-09 PROCEDURE — 2500000003 HC RX 250 WO HCPCS: Performed by: STUDENT IN AN ORGANIZED HEALTH CARE EDUCATION/TRAINING PROGRAM

## 2022-03-09 PROCEDURE — 6360000002 HC RX W HCPCS: Performed by: STUDENT IN AN ORGANIZED HEALTH CARE EDUCATION/TRAINING PROGRAM

## 2022-03-09 PROCEDURE — 2580000003 HC RX 258: Performed by: STUDENT IN AN ORGANIZED HEALTH CARE EDUCATION/TRAINING PROGRAM

## 2022-03-09 RX ADMIN — ENOXAPARIN SODIUM 40 MG: 100 INJECTION SUBCUTANEOUS at 08:26

## 2022-03-09 RX ADMIN — SODIUM CHLORIDE, PRESERVATIVE FREE 10 ML: 5 INJECTION INTRAVENOUS at 08:15

## 2022-03-09 RX ADMIN — Medication: at 18:33

## 2022-03-09 RX ADMIN — VANCOMYCIN HYDROCHLORIDE 1250 MG: 5 INJECTION, POWDER, LYOPHILIZED, FOR SOLUTION INTRAVENOUS at 18:40

## 2022-03-09 RX ADMIN — SODIUM CHLORIDE, PRESERVATIVE FREE 10 ML: 5 INJECTION INTRAVENOUS at 20:31

## 2022-03-09 RX ADMIN — CLINDAMYCIN PHOSPHATE 600 MG: 600 INJECTION, SOLUTION INTRAVENOUS at 00:43

## 2022-03-09 RX ADMIN — SODIUM CHLORIDE 25 ML: 9 INJECTION, SOLUTION INTRAVENOUS at 08:18

## 2022-03-09 RX ADMIN — CLINDAMYCIN PHOSPHATE 600 MG: 600 INJECTION, SOLUTION INTRAVENOUS at 08:24

## 2022-03-09 ASSESSMENT — PAIN SCALES - GENERAL: PAINLEVEL_OUTOF10: 0

## 2022-03-09 NOTE — PROGRESS NOTES
Hospitalist Progress Note      Name:  Meghann Valiente /Age/Sex: 1955  (61 y.o. male)   MRN & CSN:  [id number] & [id number] Admission Date/Time: 3/7/2022 10:42 PM   Location:  [address] [place] [address]-A PCP: No primary care provider on file. Hospital Day: 3    Assessment and Plan:   Meghann Valiente is a 61 y.o.  male  who presents with Sepsis ([facility]     1. Cellulitis of left upper limb  2. Sepsis without septic shock,   - Continue IV Antibiotics  - Will get ID consult for evaluation of appropriate Antibiotics  - I will discontinue Clinda and start Vanco sine nares is positive for MRSA  - Will start Mupirocin to the nares.       2. Hyponatremia  3. Hypokalemia  - Monitor BMP  - Replace K+     4. Noncompliant  5. Tobacco abuse  1. Despite extensive cardiovascular history patient states he is not on any medication  2. Recommend immediate cessation from tobacco use      Diet ADULT DIET; Regular; Low Fat/Low Chol/High Fiber/RASHID; No Added Salt (3-4 gm)   DVT Prophylaxis [] Lovenox, []  Heparin, [] SCDs, [] Ambulation   GI Prophylaxis [] PPI,  [] H2 Blocker,  [] Carafate,  [] Diet/Tube Feeds   Code Status Full Code   Disposition Patient requires continued admission due to Cellulitis   MDM [] Low, [] Moderate,[]  High       History of Present Illness:     Chief Complaint: Sepsis ([facility]  Meghann Valiente is a 61 y.o.  male  who presents with above complaint     Subjective : No new event overnight. Patient seen and evaluated in the room. He is observed laying down in bed. He was complaining of arm pain, he thinks he was bitten by spider. Objective: Intake/Output Summary (Last 24 hours) at 3/9/2022 1721  Last data filed at 3/8/2022 2011  Gross per 24 hour   Intake 120 ml   Output --   Net 120 ml      Vitals:   Vitals:    22 1631   BP:    Pulse:    Resp:    Temp:    SpO2: 97%     Physical Exam:   GEN Awake male, sitting upright in bed in no apparent distress. Appears given age.   EYES Pupils are

## 2022-03-09 NOTE — PROGRESS NOTES
[facility]  consulted by Dr. Santhsoh Hale for monitoring and adjustment. Indication for treatment: Skin/Skin Structure infection  Goal trough: 10-15 mcg/mL  AUC  <500    Pertinent Laboratory Values:   Temp Readings from Last 3 Encounters:   03/09/22 98 °F (36.7 °C) (Oral)     Recent Labs     03/07/22  2310   WBC 13.3*   LACTATE 1.6     Recent Labs     03/07/22  2310   BUN 6   CREATININE 0.9     Estimated Creatinine Clearance: 78 mL/min (based on SCr of 0.9 mg/dL). Intake/Output Summary (Last 24 hours) at 3/9/2022 1753  Last data filed at 3/8/2022 2011  Gross per 24 hour   Intake 120 ml   Output --   Net 120 ml       Pertinent Cultures:  Date    Source    Results  3/8/22   Nasal    MRSA  3/8/22   Wound    Strept Pyogenes    Vancomycin level:   TROUGH:  No results for input(s): VANCOTROUGH in the last 72 hours. RANDOM:  No results for input(s): VANCORANDOM in the last 72 hours. Assessment:  · WBC and temperature: WBC elevated  · SCr, BUN, and urine output: WNL  · Day(s) of therapy:  Day 1  · Vancomycin concentration: To be collected    Plan:  · Vancomycin 1250mg IVPB x 1 then 750mg Q12h  · Pharmacy will continue to monitor patient and adjust therapy as indicated    [place] 3 3/11/2022 @0600    Thank you for the consult.   Simi [address] [place] [address], [facility]   3/9/2022 5:53 PM

## 2022-03-10 VITALS
HEIGHT: 71 IN | WEIGHT: 148.15 LBS | BODY MASS INDEX: 20.74 KG/M2 | RESPIRATION RATE: 18 BRPM | SYSTOLIC BLOOD PRESSURE: 105 MMHG | DIASTOLIC BLOOD PRESSURE: 66 MMHG | OXYGEN SATURATION: 95 % | HEART RATE: 87 BPM | TEMPERATURE: 97.8 F

## 2022-03-10 LAB
ANION GAP SERPL CALCULATED.3IONS-SCNC: 14 MMOL/L (ref 4–16)
BASOPHILS ABSOLUTE: 0.1 K/CU MM
BASOPHILS RELATIVE PERCENT: 0.9 % (ref 0–1)
BUN BLDV-MCNC: 10 MG/DL (ref 6–23)
CALCIUM SERPL-MCNC: 8.7 MG/DL (ref 8.3–10.6)
CHLORIDE BLD-SCNC: 99 MMOL/L (ref 99–110)
CO2: 22 MMOL/L (ref 21–32)
CREAT SERPL-MCNC: 0.9 MG/DL (ref 0.9–1.3)
CULTURE: NORMAL
DIFFERENTIAL TYPE: ABNORMAL
EOSINOPHILS ABSOLUTE: 0.3 K/CU MM
EOSINOPHILS RELATIVE PERCENT: 4.2 % (ref 0–3)
GFR AFRICAN AMERICAN: >60 ML/MIN/1.73M2
GFR NON-AFRICAN AMERICAN: >60 ML/MIN/1.73M2
GLUCOSE BLD-MCNC: 118 MG/DL (ref 70–99)
HCT VFR BLD CALC: 47.1 % (ref 42–52)
HEMOGLOBIN: 15.1 GM/DL (ref 13.5–18)
IMMATURE NEUTROPHIL %: 1 % (ref 0–0.43)
LYMPHOCYTES ABSOLUTE: 1.7 K/CU MM
LYMPHOCYTES RELATIVE PERCENT: 21.2 % (ref 24–44)
Lab: NORMAL
MCH RBC QN AUTO: 32.3 PG (ref 27–31)
MCHC RBC AUTO-ENTMCNC: 32.1 % (ref 32–36)
MCV RBC AUTO: 100.9 FL (ref 78–100)
MONOCYTES ABSOLUTE: 0.7 K/CU MM
MONOCYTES RELATIVE PERCENT: 8.9 % (ref 0–4)
NUCLEATED RBC %: 0 %
PDW BLD-RTO: 13.3 % (ref 11.7–14.9)
PLATELET # BLD: 188 K/CU MM (ref 140–440)
PMV BLD AUTO: 9 FL (ref 7.5–11.1)
POTASSIUM SERPL-SCNC: 4.5 MMOL/L (ref 3.5–5.1)
RBC # BLD: 4.67 M/CU MM (ref 4.6–6.2)
SEGMENTED NEUTROPHILS ABSOLUTE COUNT: 5 K/CU MM
SEGMENTED NEUTROPHILS RELATIVE PERCENT: 63.8 % (ref 36–66)
SODIUM BLD-SCNC: 135 MMOL/L (ref 135–145)
SPECIMEN: NORMAL
TOTAL IMMATURE NEUTOROPHIL: 0.08 K/CU MM
TOTAL NUCLEATED RBC: 0 K/CU MM
WBC # BLD: 7.8 K/CU MM (ref 4–10.5)

## 2022-03-10 PROCEDURE — 2580000003 HC RX 258: Performed by: STUDENT IN AN ORGANIZED HEALTH CARE EDUCATION/TRAINING PROGRAM

## 2022-03-10 PROCEDURE — 6370000000 HC RX 637 (ALT 250 FOR IP): Performed by: INTERNAL MEDICINE

## 2022-03-10 PROCEDURE — 6360000002 HC RX W HCPCS: Performed by: STUDENT IN AN ORGANIZED HEALTH CARE EDUCATION/TRAINING PROGRAM

## 2022-03-10 PROCEDURE — 85025 COMPLETE CBC W/AUTO DIFF WBC: CPT

## 2022-03-10 PROCEDURE — 80048 BASIC METABOLIC PNL TOTAL CA: CPT

## 2022-03-10 PROCEDURE — 6360000002 HC RX W HCPCS: Performed by: INTERNAL MEDICINE

## 2022-03-10 PROCEDURE — 94761 N-INVAS EAR/PLS OXIMETRY MLT: CPT

## 2022-03-10 PROCEDURE — 99222 1ST HOSP IP/OBS MODERATE 55: CPT | Performed by: INTERNAL MEDICINE

## 2022-03-10 PROCEDURE — 36415 COLL VENOUS BLD VENIPUNCTURE: CPT

## 2022-03-10 PROCEDURE — 2500000003 HC RX 250 WO HCPCS: Performed by: INTERNAL MEDICINE

## 2022-03-10 RX ORDER — NICOTINE 21 MG/24HR
1 PATCH, TRANSDERMAL 24 HOURS TRANSDERMAL DAILY
Qty: 15 PATCH | Refills: 0 | Status: CANCELLED | OUTPATIENT
Start: 2022-03-11 | End: 2022-03-26

## 2022-03-10 RX ORDER — LINEZOLID 600 MG/1
600 TABLET, FILM COATED ORAL EVERY 12 HOURS SCHEDULED
Status: DISCONTINUED | OUTPATIENT
Start: 2022-03-10 | End: 2022-03-10 | Stop reason: HOSPADM

## 2022-03-10 RX ORDER — LINEZOLID 600 MG/1
600 TABLET, FILM COATED ORAL EVERY 12 HOURS SCHEDULED
Qty: 27 TABLET | Refills: 0 | Status: CANCELLED | OUTPATIENT
Start: 2022-03-10 | End: 2022-03-24

## 2022-03-10 RX ADMIN — SODIUM CHLORIDE, PRESERVATIVE FREE 10 ML: 5 INJECTION INTRAVENOUS at 09:26

## 2022-03-10 RX ADMIN — CHLORHEXIDINE GLUCONATE: 4 LIQUID TOPICAL at 11:42

## 2022-03-10 RX ADMIN — ENOXAPARIN SODIUM 40 MG: 100 INJECTION SUBCUTANEOUS at 09:23

## 2022-03-10 RX ADMIN — DEXTROSE MONOHYDRATE 750 MG: 50 INJECTION, SOLUTION INTRAVENOUS at 06:43

## 2022-03-10 RX ADMIN — Medication: at 09:22

## 2022-03-10 RX ADMIN — LINEZOLID 600 MG: 600 TABLET, FILM COATED ORAL at 11:41

## 2022-03-10 NOTE — CARE COORDINATION
Reviewed chart and discussed in IDR, plan is home once atb determined, awaiting ID recommendations. If needs iv atb will need to set up home infusion/HC. 1400 PS to Dr Aashish Mejia with pt's request to get him home today ASAP.       [id number] still awaiting ID recommendations. PS to [employer]. 36 Pt came to [facility] desk stating his ride will be here at 1800 and he is leaving. PS to Dr Elissa Casper. [address] with HENRY Bland she is not sure what No's plan is for this pt. She has tried to reach out to him with no answer.

## 2022-03-10 NOTE — PROGRESS NOTES
Pt has been pacing the halls and in his room all day. Has approached this nurse multiple times throughout the day regarding \"Waiting on paperwork so I can leave\". Explained to pt all day that we are still waiting on discharge orders. This nurse messaged ID regarding pt earlier this shift. No response as of now.  Pt now reported to charge nurse that he is leaving [facility]
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
## 2022-03-10 NOTE — PROGRESS NOTES
Hospitalist Progress Note      Name:  Carlo Miller /Age/Sex: 1955  (61 y.o. male)   MRN & CSN:  [id number] & [id number] Admission Date/Time: 3/7/2022 10:42 PM   Location:  [address] [place] [address]-A PCP: No primary care provider on file. Hospital Day: 4    Assessment and Plan:       61 y.o.  male  who presents with Sepsis ([address]     1. Cellulitis of left upper limb  2. Sepsis without septic shock,   - Continue IV Antibiotics vancomycin pharmacy to dose  Patient has spider bite on the lower part of left forearm  MRSA screen positive  - get ID consult for evaluation of appropriate Antibiotics  - discontinue Clinda and start Vanco sine nares is positive for MRSA  - on  Mupirocin to the nares. Sepsis resolved no leukocytosis no fever  X-ray of the left wrist showed dorsal soft tissue swelling no underlying osseous abnormality     2. Hyponatremia resolved  3. Hypokalemia resolved  - Monitor BMP  - Replace K+     4. Noncompliant  5. Tobacco abuse  1. Despite extensive cardiovascular history patient states he is not on any medication  2. Recommend immediate cessation from tobacco use      Diet ADULT DIET; Regular; Low Fat/Low Chol/High Fiber/RASHID; No Added Salt (3-4 gm)   DVT Prophylaxis [x] Lovenox, []  Heparin, [] SCDs, [] Ambulation   GI Prophylaxis [] PPI,  [] H2 Blocker,  [] Carafate,  [] Diet/Tube Feeds   Code Status Full Code   Disposition Patient requires continued admission due to    MDM [] Low, [] Moderate,[]  High  Patient's risk as above due to      History of Present Illness: The patient was seen and examined at the bedside  Patient feels well denies fever denies feeling dizzy or lightheaded  No fever  No chest pain or shortness of breath  Objective:      Intake/Output Summary (Last 24 hours) at 3/10/2022 0935  Last data filed at 3/9/2022 1828  Gross per 24 hour   Intake 480 ml   Output --   Net 480 ml      Vitals:   Vitals:    03/10/22 0750   BP: (!) 120/94   Pulse: 76   Resp:    Temp:    SpO2: 96%     Physical Exam:   GEN Awake.  Alert , not in respiratory distress, not in pain  HEENT: PEERLA, , supple neck,   Chest: air entry equal bilaterally, no wheezing or crepitation  Heart: S1 and S2 heard, no murmur, no gallop or rub, regular rate  Abdomen: soft, ND , Nt, +BS  Extremities: no cyanosis, tenderness or erythema, peripheral pulses audible  Spider bit noted on left lower forearm with surrounding cellulitis   Neurology: alert, oriented x3, able to move 4 limbs    Medications:   Medications:    mupirocin   Topical Daily    vancomycin  750 mg IntraVENous Q12H    sodium chloride flush  5-40 mL IntraVENous 2 times per day    enoxaparin  40 mg SubCUTAneous Daily    nicotine  1 patch TransDERmal Daily      Infusions:    sodium chloride 25 mL (03/09/22 0818)     PRN Meds: sodium chloride flush, 5-40 mL, PRN  sodium chloride, 25 mL, PRN  ondansetron, 4 mg, Q8H PRN   Or  ondansetron, 4 mg, Q6H PRN  polyethylene glycol, 17 g, Daily PRN  acetaminophen, 650 mg, Q6H PRN   Or  acetaminophen, 650 mg, Q6H PRN  ketorolac, 15 mg, Q6H PRN          Electronically signed by Alek Baca MD on 3/10/2022 at 9:35 AM
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
## 2022-03-10 NOTE — DISCHARGE SUMMARY
61years old male was admitted because of sepsis with cellulitis of left forearm and arm started on IV antibiotic, ID consulted, patient left AGAINST MEDICAL ADVICE. Patient still has infected lesion over the dorsum of lower part of the left forearm. I went and talked to the patient and explained the patient the importance that ID see  the patient and give recommendations regarding antibiotic choice and duration.   The patient signed AMA paper   Did prescribe linezolid 600 mg bid for 14 days and mupricin topical around nares for 5 days  CBC weekly twice   Follow up with PCP in 1-2 week

## 2022-03-11 RX ORDER — MUPIROCIN CALCIUM 20 MG/G
CREAM TOPICAL
Qty: 1 EACH | Refills: 0 | Status: SHIPPED | OUTPATIENT
Start: 2022-03-11

## 2022-03-11 RX ORDER — LINEZOLID 600 MG/1
600 TABLET, FILM COATED ORAL 2 TIMES DAILY
Qty: 28 TABLET | Refills: 0 | Status: SHIPPED | OUTPATIENT
Start: 2022-03-11 | End: 2022-03-25

## 2022-03-11 NOTE — PROGRESS NOTES
Patient does not have a phone number listed in our [address] [place] [address] Rd. Found contact info through care everywhere from [facility]. Information given to Dr Ricardo Johnson to contact patient. He was unable to reach, left message. [facility]  Emergency Contacts: Mother: Lazaro Jay: [phone]  Sister: Ro Rush: [phone]    Re: Patient left AMA yesterday prior to receiving recommendations from ID. Dr Alexandro Neal did prescribe linezolid 600 mg bid for 14 days and mupricin topical around nares for 5 days  CBC weekly twice   Follow up with PCP in 1-2 week is ID recs, sent to our pharmacy, labs ordered. If the patient returns call please make sure the patient is aware of this. We have not been able to reach him at this time.

## 2022-03-11 NOTE — PROGRESS NOTES
Outpatient Pharmacy Progress Note for Meds-to-Beds    Total number of Prescriptions Filled: 0    Additional Documentation:  Medications were not picked up by end of day 3/11/22. Thank you for letting us serve your patients.   [address] [place] [address] [place]    [address] [facility]    Phone: [phone]    Fax: [phone]      2

## 2022-03-11 NOTE — PROGRESS NOTES
Patient does have a phone number listed. Found contact info through care everywhere from [facility]. Information given to Dr Maritza Meneses to contact patient. He was unable to reach, left message. [facility]  Emergency Contacts: Mother: Jie Cano: [phone]  Sister: Neil Chavez: [phone]    Re: Patient left AMA yesterday prior to receiving recommendations from ID. Dr Sarah Abad did prescribe linezolid 600 mg bid for 14 days and mupricin topical around nares for 5 days  CBC weekly twice   Follow up with PCP in 1-2 week is ID recs, sent to our pharmacy, labs ordered. If the patient returns call please make sure the patient is aware of this. We have not been able to reach him at this time.

## 2022-03-13 LAB
CULTURE: ABNORMAL
CULTURE: ABNORMAL
CULTURE: NORMAL
Lab: ABNORMAL
Lab: NORMAL
SPECIMEN: ABNORMAL
SPECIMEN: NORMAL

## 2024-11-03 ENCOUNTER — HOSPITAL ENCOUNTER (EMERGENCY)
Age: 69
Discharge: HOME | End: 2024-11-03
Payer: MEDICARE

## 2024-11-03 VITALS
BODY MASS INDEX: 18.2 KG/M2 | WEIGHT: 130 LBS | HEIGHT: 71 IN | OXYGEN SATURATION: 95 % | TEMPERATURE: 97 F | HEART RATE: 95 BPM | DIASTOLIC BLOOD PRESSURE: 88 MMHG | SYSTOLIC BLOOD PRESSURE: 112 MMHG | RESPIRATION RATE: 18 BRPM

## 2024-11-03 DIAGNOSIS — T63.461A WASP STING, ACCIDENTAL OR UNINTENTIONAL, INITIAL ENCOUNTER: Primary | ICD-10-CM

## 2024-11-03 PROCEDURE — 6370000000 HC RX 637 (ALT 250 FOR IP)

## 2024-11-03 PROCEDURE — 99283 EMERGENCY DEPT VISIT LOW MDM: CPT

## 2024-11-03 RX ORDER — ACETAMINOPHEN 325 MG/1
650 TABLET ORAL ONCE
Status: COMPLETED | OUTPATIENT
Start: 2024-11-03 | End: 2024-11-03

## 2024-11-03 RX ORDER — CETIRIZINE HYDROCHLORIDE 10 MG/1
10 TABLET ORAL DAILY
Status: DISCONTINUED | OUTPATIENT
Start: 2024-11-03 | End: 2024-11-03 | Stop reason: HOSPADM

## 2024-11-03 RX ORDER — CETIRIZINE HYDROCHLORIDE 10 MG/1
10 TABLET ORAL DAILY
Qty: 30 TABLET | Refills: 0 | Status: SHIPPED | OUTPATIENT
Start: 2024-11-03

## 2024-11-03 RX ORDER — PREDNISONE 20 MG/1
60 TABLET ORAL ONCE
Status: COMPLETED | OUTPATIENT
Start: 2024-11-03 | End: 2024-11-03

## 2024-11-03 RX ORDER — PREDNISONE 50 MG/1
50 TABLET ORAL DAILY
Qty: 5 TABLET | Refills: 0 | Status: SHIPPED | OUTPATIENT
Start: 2024-11-03 | End: 2024-11-08

## 2024-11-03 RX ADMIN — PREDNISONE 60 MG: 20 TABLET ORAL at 17:49

## 2024-11-03 RX ADMIN — ACETAMINOPHEN 650 MG: 325 TABLET ORAL at 17:49

## 2024-11-03 RX ADMIN — CETIRIZINE HYDROCHLORIDE 10 MG: 10 TABLET, FILM COATED ORAL at 17:49

## 2024-11-03 ASSESSMENT — PAIN - FUNCTIONAL ASSESSMENT: PAIN_FUNCTIONAL_ASSESSMENT: 0-10

## 2024-11-03 ASSESSMENT — LIFESTYLE VARIABLES
HOW OFTEN DO YOU HAVE A DRINK CONTAINING ALCOHOL: NEVER
HOW MANY STANDARD DRINKS CONTAINING ALCOHOL DO YOU HAVE ON A TYPICAL DAY: PATIENT DOES NOT DRINK

## 2024-11-03 ASSESSMENT — PAIN DESCRIPTION - LOCATION: LOCATION: OTHER (COMMENT)

## 2024-11-03 ASSESSMENT — PAIN SCALES - GENERAL: PAINLEVEL_OUTOF10: 8

## 2024-11-03 ASSESSMENT — PAIN DESCRIPTION - DESCRIPTORS: DESCRIPTORS: OTHER (COMMENT)
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
## 2024-11-03 NOTE — DISCHARGE INSTRUCTIONS
It was my pleasure taking care of you in the emergency department today.  If we prescribed any medications, please be sure to take them as prescribed. Continue taking medications as prescribed by your PCP unless we discussed otherwise.   Please be sure to follow-up with your PCP within the next 1-2 weeks.  Please don't hesitate to return to the emergency department in case of any worsening symptoms.  Wish you a speedy recovery.  Most sincerely,    Angle Kelly CNP

## 2024-11-03 NOTE — ED TRIAGE NOTES
Patient to the ED via walk in with a bee sting to the right lower lip that occurred 20 minutes ago (approx 1615). Pt has a small amount of edema at the site, is not in acute distress, does not appear to be having an allergic reaction, and denies any needs